# Patient Record
Sex: MALE | Race: ASIAN | NOT HISPANIC OR LATINO | Employment: FULL TIME | URBAN - METROPOLITAN AREA
[De-identification: names, ages, dates, MRNs, and addresses within clinical notes are randomized per-mention and may not be internally consistent; named-entity substitution may affect disease eponyms.]

---

## 2019-08-05 ENCOUNTER — OFFICE VISIT (OUTPATIENT)
Dept: FAMILY MEDICINE | Facility: CLINIC | Age: 54
End: 2019-08-05
Payer: COMMERCIAL

## 2019-08-05 VITALS
RESPIRATION RATE: 18 BRPM | DIASTOLIC BLOOD PRESSURE: 88 MMHG | HEIGHT: 64 IN | HEART RATE: 87 BPM | BODY MASS INDEX: 19.63 KG/M2 | TEMPERATURE: 98 F | SYSTOLIC BLOOD PRESSURE: 135 MMHG | OXYGEN SATURATION: 99 % | WEIGHT: 115 LBS

## 2019-08-05 DIAGNOSIS — R07.0 THROAT PAIN: Primary | ICD-10-CM

## 2019-08-05 DIAGNOSIS — J02.9 ACUTE VIRAL PHARYNGITIS: ICD-10-CM

## 2019-08-05 RX ORDER — IBUPROFEN 400 MG/1
400 TABLET, FILM COATED ORAL EVERY 6 HOURS PRN
Qty: 90 TABLET | Refills: 0 | Status: SHIPPED | OUTPATIENT
Start: 2019-08-05

## 2019-08-05 ASSESSMENT — MIFFLIN-ST. JEOR: SCORE: 1272.64

## 2019-08-05 NOTE — PROGRESS NOTES
[facility]         JOSE ALEJANDRO Cabrera is a 54 year old male with a presenting to clinic today with a chief complaint of sore throat.    Patient noticed acute onset throat pain on right lateral side of his neck that started 3 days ago. He reports a non-radiating, persistent dull ache that is progressively improving. Pain worsens with swallowing food or liquids and gets better with ice or tyelenol. He denies fever or chills, cough, fatigue, oral ulcer, nor nasal congestion. Accompanying symptoms include node enlargement at the same location that did not change in size as his pain improved. He also reports gluteal pain exacerbated by movement that started a day after his throat pain. He denies experiencing similar symptoms in the past and has no history of throat pain, fever, or cough during the past month. None of his family members are sick at this time. Patient thinks that his consumption of betel nuts may have caused his pain. His primary concern at this time is difficulty eating food.    PMH, Medications and Allergies were reviewed and updated as needed.    ROS:  General: No fevers, chills  Head: No headache. Positive for pain with swallowing.  CV: No chest pain or palpitations.  Resp: No shortness of breath.  No cough  GI: No nausea, vomiting, diarrhea  : No urinary pains    There is no problem list on file for this patient.      Current Outpatient Medications   Medication Sig Dispense Refill     ibuprofen (ADVIL/MOTRIN) 400 MG tablet Take 1 tablet (400 mg) by mouth every 6 hours as needed for moderate pain 90 tablet 0     cetirizine (ZYRTEC) 10 MG tablet Take 1 tablet (10 mg) by mouth every evening (Patient not taking: Reported on 8/5/2019) 30 tablet 1     fluticasone (FLONASE) 50 MCG/ACT nasal spray Spray 1-2 sprays into both nostrils daily (Patient not taking: Reported on 8/5/2019) 1 Bottle 11            OBJECTIVE:       Vitals:   Vitals:    08/05/19 0948   BP: 135/88   BP Location:  "Left arm   Patient Position: Sitting   Cuff Size: Adult Regular   Pulse: 87   Resp: 18   Temp: 98  F (36.7  C)   TempSrc: Oral   SpO2: 99%   Weight: 52.2 kg (115 lb)   Height: 1.626 m (5' 4\")     BMI: Body mass index is 19.74 kg/m .    Gen:  Well nourished and in no acute distress  HEENT: Extraocular movement intact.  Neck: Tender node on right lateral side of neck.   CV:  RRR  - no murmurs noted   Pulm:  CTAB, no wheezes or crackles noted, good air entry   ABD: soft, nontender, no masses, no rebound,  Extrem: no cyanosis, edema or clubbing  Psych: Euthymic           ASSESSMENT and PLAN:       Acute Viral Pharyngitis/throat pain: Absence of tonsillar exudate, fever and age of 54 decreases likelihood of GAS. Lymph node enlargement maybe secondary to viral process. Patient advised to use oral analgesics for symptom management. Recommended to return to clinic if symptoms do not improve in the course of the next 3 days. Educated methods to reduce throat pain. Informed patient about increasing chances of cancer with prolonged betel nut consumption and advised to decrease using them.  Stressed the importance of returning to clinic if his symptoms should return with the patient given his concerning history of prolonged fetal not use and high prevalence of oropharyngeal cancer with this.  - ibuprofen 400mg oral Q6H PRN, 90 tablets    Return to clinic in 3 days for follow up of throat pain if symptoms don't improve. Return sooner if develops new or worsening symptoms.    Options for treatment and/or follow-up care were reviewed with the patient was actively involved in the decision making process. Patient verbalized understanding and was in agreement with the plan.    Jim Pierce, MS-3    The patient was seen by and discussed with Harinder Raines MD    I was present with the medical student who participated in the service and in the documentation of this note. I have verified the history and personally performed the physical " exam and medical decision making, and have verified the content of the note, which accurately reflects my assessment of the patient and the plan of care.   Mirlande Panchal, DO Mirlande Panchal, DO PGY 3  [facility]  [phone]     denies pain/discomfort

## 2019-08-05 NOTE — NURSING NOTE
Due to patient being non-English speaking/uses sign language, an  was used for this visit. Only for face-to-face interpretation by an external agency, date and length of interpretation can be found on the scanned worksheet.       name: Rigo Sierra  Language: Shelli  Agency: [employer]  Phone number: [phone]  Type of interpretation:  Face-to-face, Spoken

## 2019-08-05 NOTE — PATIENT INSTRUCTIONS
Patient Education     When You Have a Sore Throat    A sore throat can be painful. There are many reasons why you may have a sore throat. Your healthcare provider will work with you to find the cause of your sore throat. He or she will also find the best treatment for you.  What causes a sore throat?  Sore throats can be caused or worsened by:    Cold or flu viruses    Bacteria    Irritants such as tobacco smoke or air pollution    Acid reflux  A healthy throat  The tonsils are on the sides of the throat near the base of the tongue. They collect viruses and bacteria and help fight infection. The throat (pharynx) is the passage for air. Mucus from the nasal cavity also moves down the passage.  An inflamed throat  The tonsils and pharynx can become inflamed due to a cold or flu virus. Postnasal drip (excess mucus draining from the nasal cavity) can irritate the throat. It can also make the throat or tonsils more likely to be infected by bacteria. Severe, untreated tonsillitis in children or adults can cause a pocket of pus (abscess) to form near the tonsil.  Your evaluation  A medical evaluation can help find the cause of your sore throat. It can also help your healthcare provider choose the best treatment for you. The evaluation may include a health history, physical exam, and diagnostic tests.  Health history  Your healthcare provider may ask you the following:    How long has the sore throat lasted and how have you been treating it?    Do you have any other symptoms, such as body aches, fever, or cough?    Does your sore throat recur? If so, how often? How many days of school or work have you missed because of a sore throat?    Do you have trouble eating or swallowing?    Have you been told that you snore or have other sleep problems?    Do you have bad breath?    Do you cough up bad-tasting mucus?  Physical exam  During the exam, your healthcare provider checks your ears, nose, and throat for problems. He or she  "also checks for swelling in the neck, and may listen to your chest.  Possible tests  Other tests your healthcare provider may perform include:    A throat swab to check for bacteria such as streptococcus (the bacteria that causes strep throat)    A blood test to check for mononucleosis (a viral infection)    A chest X-ray to rule out pneumonia, especially if you have a cough  Treating a sore throat  Treatment depends on many factors. What is the likely cause? Is the problem recent? Does it keep coming back? In many cases, the best thing to do is to treat the symptoms, rest, and let the problem heal itself. Antibiotics may help clear up some bacterial infections. For cases of severe or recurring tonsillitis, the tonsils may need to be removed.  Relieving your symptoms    Don t smoke, and avoid secondhand smoke.    For children, try throat sprays or Popsicles. Adults and older children may try lozenges.    Drink warm liquids to soothe the throat and help thin mucus. Avoid alcohol, spicy foods, and acidic drinks such as orange juice. These can irritate the throat.    Gargle with warm saltwater (1 teaspoon of salt to 8 ounces of warm water).    Use a humidifier to keep air moist and relieve throat dryness.    Try over-the-counter pain relievers such as acetaminophen or ibuprofen. Use as directed, and don t exceed the recommended dose. Don t give aspirin to children.   Are antibiotics needed?  If your sore throat is due to a bacterial infection, antibiotics may speed healing and prevent complications. Although group A streptococcus (\"strep throat\" or GAS) is the major treatable infection for a sore throat, GAS causes only 5% to 15% of sore throats in adults who seek medical care. Most sore throats are caused by cold or flu viruses. And antibiotics don t treat viral illness. In fact, using antibiotics when they re not needed may produce bacteria that are harder to kill. Your healthcare provider will prescribe antibiotics " only if he or she thinks they are likely to help.  If antibiotics are prescribed  Take the medicine exactly as directed. Be sure to finish your prescription even if you re feeling better. And be sure to ask your healthcare provider or pharmacist what side effects are common and what to do about them.  Is surgery needed?  In some cases, tonsils need to be removed. This is often done as outpatient (same-day) surgery. Your healthcare provider may advise removing the tonsils in cases of:    Several severe bouts of tonsillitis in a year.  Severe  episodes include those that lead to missed days of school or work, or that need to be treated with antibiotics.    Tonsillitis that causes breathing problems during sleep    Tonsillitis caused by food particles collecting in pouches in the tonsils (cryptic tonsillitis)  Call your healthcare provider if any of the following occur:    Symptoms worsen, or new symptoms develop.    Swollen tonsils make breathing difficult.    The pain is severe enough to keep you from drinking liquids.    A skin rash, hives, or wheezing develops. Any of these could signal an allergic reaction to antibiotics.    Symptoms don t improve within a week.    Symptoms don t improve within 2 to 3 days of starting antibiotics.   Date Last Reviewed: 10/1/2016    5950-9170 [employer]. [address] [place] [address], [place] [address]. All rights reserved. This information is not intended as a substitute for professional medical care. Always follow your healthcare professional's instructions.

## 2019-08-05 NOTE — PROGRESS NOTES
Preceptor Attestation:   Patient seen, evaluated and discussed with the resident. I have verified the content of the note, which accurately reflects my assessment of the patient and the plan of care.   Supervising Physician:  Harinder Raines MD

## 2019-08-09 ENCOUNTER — OFFICE VISIT (OUTPATIENT)
Dept: FAMILY MEDICINE | Facility: CLINIC | Age: 54
End: 2019-08-09
Payer: COMMERCIAL

## 2019-08-09 VITALS
DIASTOLIC BLOOD PRESSURE: 87 MMHG | HEART RATE: 81 BPM | SYSTOLIC BLOOD PRESSURE: 131 MMHG | TEMPERATURE: 98.1 F | BODY MASS INDEX: 20.69 KG/M2 | OXYGEN SATURATION: 98 % | RESPIRATION RATE: 16 BRPM | HEIGHT: 64 IN | WEIGHT: 121.2 LBS

## 2019-08-09 DIAGNOSIS — R07.0 THROAT PAIN: ICD-10-CM

## 2019-08-09 DIAGNOSIS — J02.9 ACUTE VIRAL PHARYNGITIS: Primary | ICD-10-CM

## 2019-08-09 ASSESSMENT — MIFFLIN-ST. JEOR: SCORE: 1296.79

## 2019-08-09 NOTE — PROGRESS NOTES
[facility]         JOSE ALEJANDRO Cabrera is a 54 year old male is here today to follow up on his acute viral pharyngitis/throat pain. He was last seen 4 days ago on 8/05. He reports that he continues to feel pain on his right lateral neck, but is generally better compared to his last visit. Pain is non-constant and non-radiating that is aggravated with swallowing, yawning, and smelling burned meat. Pain rated as 3,4 out of 10. He reports that he feels as if something is stuck in his throat after swallowing. He has been using ibuprofen prescribed from his last visit that helps with the pain. He continues to have a estrella swelling on his right lateral neck. He denies cough, mouth dryness, or headaches. He reports hearing sounds when tapping below his right ear. He discontinued eating betle nuts and used salt water gargle since his last visit.    PMH, Medications and Allergies were reviewed and updated as needed.    ROS:  General: No fevers, headaches, chills  Head: No headache  Ears: No acute change in hearing. Hears sounds when he pushes below his right ear.   Neck: estrella swelling below on upper right neck on lateral side  CV: No chest pain or palpitations.  Resp: No shortness of breath.  No cough. No hemoptysis.  GI: No nausea, vomiting, constipation, diarrhea  : No urinary pains    There is no problem list on file for this patient.      Current Outpatient Medications   Medication Sig Dispense Refill     ibuprofen (ADVIL/MOTRIN) 400 MG tablet Take 1 tablet (400 mg) by mouth every 6 hours as needed for moderate pain 90 tablet 0     cetirizine (ZYRTEC) 10 MG tablet Take 1 tablet (10 mg) by mouth every evening (Patient not taking: Reported on 8/5/2019) 30 tablet 1     fluticasone (FLONASE) 50 MCG/ACT nasal spray Spray 1-2 sprays into both nostrils daily (Patient not taking: Reported on 8/5/2019) 1 Bottle 11            OBJECTIVE:       Vitals:   Vitals:    08/09/19 0812 08/09/19 0815   BP: (!)  "143/90 131/87   BP Location: Left arm Left arm   Patient Position: Sitting Sitting   Pulse: 81    Resp: 16    Temp: 98.1  F (36.7  C)    TempSrc: Oral    SpO2: 98%    Weight: 55 kg (121 lb 3.2 oz)    Height: 1.619 m (5' 3.75\")      BMI: Body mass index is 20.97 kg/m .    Gen:  Well nourished and in no acute distress  HEENT: No swelling or exudate on oropharyngeal surface, slight scarring on tympanic membrane from past hx - no congestion  Neck: non-tender, estrella swelling on right lateral upper neck. Improved from prior exam.   CV:  RRR  - no murmurs noted   Pulm:  CTAB, no wheezes or crackles noted, good air entry   Psych: Euthymic           ASSESSMENT and PLAN:     Acute Viral Pharyngitis/throat pain:  Absence of exudate or swelling in the oropharynx and fever decreases the likelihood of any bacterial causes. The lymph node was slightly smaller in size and less firm compared to his last visit. This may be the swollen glands secondary to viral causes slowly resolving. Recommended patient to continue his current management plan of taking ibuprofen and using salt water gargles. Asked him to follow up in 2 weeks, unless he feels that his symptoms are resolved and there is no need to come back.    Discussed the relationship of oral pharyngeal cancers and betel nut with the patient at his last visit.  Please see that note for further details.  I do not think that this represents any cancerous cause at this time.  We will continue to closely monitor and have him return should patient's symptoms not go away.    Return to clinic in 2 weeks for follow up of throat pain. Return sooner if develops new or worsening symptoms.    Options for treatment and/or follow-up care were reviewed with the patient was actively involved in the decision making process. Patient verbalized understanding and was in agreement with the plan.    The patient was seen by and discussed with MD Jim Dawn, MS-3    I was present with " the medical student who participated in the service and in the documentation of this note. I have verified the history and personally performed the physical exam and medical decision making, and have verified the content of the note, which accurately reflects my assessment of the patient and the plan of care.     Mirlande Panchal, DO PGY 3  [facility]  [phone]

## 2019-08-09 NOTE — NURSING NOTE
Due to patient being non-English speaking/uses sign language, an  was used for this visit. Only for face-to-face interpretation by an external agency, date and length of interpretation can be found on the scanned worksheet.     name: Sen De La Cruz  Agency: [employer]  Language: Shelli   Telephone number: [phone]  Type of interpretation: Face-to-face, spoken

## 2019-08-09 NOTE — PROGRESS NOTES
Preceptor Attestation:   Patient seen, evaluated and discussed with the resident. I have verified the content of the note, which accurately reflects my assessment of the patient and the plan of care.   Supervising Physician:  Ramez Echeverria MD

## 2019-08-09 NOTE — PATIENT INSTRUCTIONS
1) Schedule 2 week follow up   2) Can cancel if symptoms completely resolve    Mirlande Panchal, DO PGY3  [facility]  [phone]

## 2019-08-09 NOTE — LETTER
August 9, 2019      Cordell Rick  [address] [address]        Dear Employer,    Please excuse Cordell Cabrera from work from 8/5/19 - 8/9/19 due to illness. Safe to return to work 8/12/19.    Sincerely,    Mirlande Panchal, DO

## 2020-04-02 ENCOUNTER — TELEPHONE (OUTPATIENT)
Dept: FAMILY MEDICINE | Facility: CLINIC | Age: 55
End: 2020-04-02

## 2020-04-02 NOTE — TELEPHONE ENCOUNTER
Called patient with an interpretor, ABILIO with his daughter informing her that our clinic is open for the whole family if anyone has concerns.     MODESTA Bailon

## 2023-10-19 ENCOUNTER — OFFICE VISIT (OUTPATIENT)
Dept: FAMILY MEDICINE | Facility: CLINIC | Age: 58
End: 2023-10-19
Payer: COMMERCIAL

## 2023-10-19 ENCOUNTER — OFFICE VISIT (OUTPATIENT)
Dept: PHARMACY | Facility: CLINIC | Age: 58
End: 2023-10-19
Payer: COMMERCIAL

## 2023-10-19 VITALS
HEIGHT: 63 IN | HEART RATE: 72 BPM | SYSTOLIC BLOOD PRESSURE: 102 MMHG | TEMPERATURE: 98.2 F | WEIGHT: 123.8 LBS | RESPIRATION RATE: 20 BRPM | OXYGEN SATURATION: 99 % | BODY MASS INDEX: 21.93 KG/M2 | DIASTOLIC BLOOD PRESSURE: 64 MMHG

## 2023-10-19 DIAGNOSIS — Z11.4 SCREENING FOR HIV (HUMAN IMMUNODEFICIENCY VIRUS): ICD-10-CM

## 2023-10-19 DIAGNOSIS — Z11.59 NEED FOR HEPATITIS C SCREENING TEST: ICD-10-CM

## 2023-10-19 DIAGNOSIS — I50.9 CONGESTIVE HEART FAILURE, UNSPECIFIED HF CHRONICITY, UNSPECIFIED HEART FAILURE TYPE (H): Primary | ICD-10-CM

## 2023-10-19 DIAGNOSIS — Z09 HOSPITAL DISCHARGE FOLLOW-UP: ICD-10-CM

## 2023-10-19 DIAGNOSIS — I50.20 HEART FAILURE WITH REDUCED EJECTION FRACTION, NYHA CLASS I (H): Primary | ICD-10-CM

## 2023-10-19 LAB
ANION GAP SERPL CALCULATED.3IONS-SCNC: 11 MMOL/L (ref 7–15)
BUN SERPL-MCNC: 25.5 MG/DL (ref 6–20)
CALCIUM SERPL-MCNC: 9.1 MG/DL (ref 8.6–10)
CHLORIDE SERPL-SCNC: 105 MMOL/L (ref 98–107)
CHOLEST SERPL-MCNC: 177 MG/DL
CREAT SERPL-MCNC: 1.47 MG/DL (ref 0.67–1.17)
DEPRECATED HCO3 PLAS-SCNC: 25 MMOL/L (ref 22–29)
EGFRCR SERPLBLD CKD-EPI 2021: 55 ML/MIN/1.73M2
ERYTHROCYTE [DISTWIDTH] IN BLOOD BY AUTOMATED COUNT: 19.8 % (ref 10–15)
GLUCOSE SERPL-MCNC: 96 MG/DL (ref 70–99)
HCT VFR BLD AUTO: 38.1 % (ref 40–53)
HCV AB SERPL QL IA: NONREACTIVE
HDLC SERPL-MCNC: 48 MG/DL
HGB BLD-MCNC: 12 G/DL (ref 13.3–17.7)
HIV 1+2 AB+HIV1 P24 AG SERPL QL IA: NONREACTIVE
LDLC SERPL CALC-MCNC: 98 MG/DL
MCH RBC QN AUTO: 20.3 PG (ref 26.5–33)
MCHC RBC AUTO-ENTMCNC: 31.5 G/DL (ref 31.5–36.5)
MCV RBC AUTO: 65 FL (ref 78–100)
NONHDLC SERPL-MCNC: 129 MG/DL
PLATELET # BLD AUTO: 289 10E3/UL (ref 150–450)
POTASSIUM SERPL-SCNC: 4.2 MMOL/L (ref 3.4–5.3)
RBC # BLD AUTO: 5.9 10E6/UL (ref 4.4–5.9)
SODIUM SERPL-SCNC: 141 MMOL/L (ref 135–145)
TRIGL SERPL-MCNC: 156 MG/DL
WBC # BLD AUTO: 9.2 10E3/UL (ref 4–11)

## 2023-10-19 PROCEDURE — 87389 HIV-1 AG W/HIV-1&-2 AB AG IA: CPT

## 2023-10-19 PROCEDURE — 80061 LIPID PANEL: CPT

## 2023-10-19 PROCEDURE — 36415 COLL VENOUS BLD VENIPUNCTURE: CPT

## 2023-10-19 PROCEDURE — 85027 COMPLETE CBC AUTOMATED: CPT

## 2023-10-19 PROCEDURE — 80048 BASIC METABOLIC PNL TOTAL CA: CPT

## 2023-10-19 PROCEDURE — 99207 PR NO CHARGE LOS: CPT | Performed by: PHARMACIST

## 2023-10-19 PROCEDURE — 99204 OFFICE O/P NEW MOD 45 MIN: CPT | Mod: GC

## 2023-10-19 PROCEDURE — 86803 HEPATITIS C AB TEST: CPT

## 2023-10-19 RX ORDER — FUROSEMIDE 40 MG
40 TABLET ORAL DAILY
COMMUNITY
End: 2023-12-31

## 2023-10-19 RX ORDER — ISOSORBIDE DINITRATE 5 MG/1
2.5 TABLET ORAL
COMMUNITY
End: 2023-11-09

## 2023-10-19 RX ORDER — ATORVASTATIN CALCIUM 40 MG/1
40 TABLET, FILM COATED ORAL DAILY
COMMUNITY
End: 2023-12-31

## 2023-10-19 RX ORDER — HYDRALAZINE HYDROCHLORIDE 10 MG/1
10 TABLET, FILM COATED ORAL 3 TIMES DAILY
COMMUNITY
End: 2023-11-09

## 2023-10-19 RX ORDER — LOSARTAN POTASSIUM 25 MG/1
25 TABLET ORAL DAILY
COMMUNITY
End: 2023-12-18

## 2023-10-19 RX ORDER — METOPROLOL SUCCINATE 25 MG/1
12.5 TABLET, EXTENDED RELEASE ORAL DAILY
COMMUNITY
End: 2023-12-20

## 2023-10-19 RX ORDER — ASPIRIN 81 MG/1
81 TABLET, CHEWABLE ORAL DAILY
COMMUNITY
End: 2023-11-09

## 2023-10-19 RX ORDER — LANOLIN ALCOHOL/MO/W.PET/CERES
3 CREAM (GRAM) TOPICAL
COMMUNITY

## 2023-10-19 RX ORDER — NITROGLYCERIN 0.4 MG/1
0.4 TABLET SUBLINGUAL EVERY 5 MIN PRN
COMMUNITY

## 2023-10-19 NOTE — Clinical Note
Fyi only: pharmacy requires I route this note to you- discussed patient in clinic today.   Dulce Vila PharmHellenD. Medication Therapy Management Pharmacy Resident

## 2023-10-19 NOTE — PROGRESS NOTES
"  Assessment & Plan     1. Screening for HIV (human immunodeficiency virus)  - HIV Screening; Future    2. Need for hepatitis C screening test  - Hepatitis C Screen Reflex to HCV RNA Quant and Genotype; Future    3. Congestive heart failure, unspecified HF chronicity, unspecified heart failure type (H)  4. Hospital discharge follow-up  Patient was seen 10/8/2023 - 10/11/2023 [facility] for new acute heart failure exacerbation.  Not able to see labs or if echo was done at that time.  Patient reports no previous diagnoses.  Reports taking all new medications as prescribed with complete resolution of symptoms.  Patient is weighing himself daily and this has been consistent.  Vitally stable, exam is unremarkable.  Pharm.D. in room for med rec.management complicated by low health literacy, language barrier.  Reviewed diagnoses and prognosis, importance of continuing treatment with close follow-up.  - Follow-up at scheduled cardiology appointment 11/3/2023, reviewed this with patient and patient reports no transportation needs  - Lipid panel reflex to direct LDL Non-fasting; Future  - Basic metabolic panel; Future  - CBC with platelets; Future  -Follow-up in 1 month for heart failure and to review results      Padma López MD  [facility] ANNELIESE Landa is a 58 year old, presenting for the following health issues:  Heart Problem (Heart failure)      10/19/2023    10:59 AM   Additional Questions   Roomed by phoebe   Accompanied by self       HPI     10/8/23 - 10/11/23 [facility]. Went in because of dyspnea, hasn't happened before. Feels better after taking medication. No longer SOB, no chest pain. No orthopnea. Has had good adherence to medications. Kids set up meds with pill box.     Has a follow-up with cardiology 11/3/23.       Review of Systems   As above      Objective    /64   Pulse 72   Temp 98.2  F (36.8  C) (Oral)   Resp 20   Ht 1.608 m (5' 3.3\")   Wt 56.2 kg (123 " lb 12.8 oz)   SpO2 99%   BMI 21.72 kg/m    Body mass index is 21.72 kg/m .  Physical Exam  Constitutional:       General: He is not in acute distress.     Appearance: Normal appearance. He is not diaphoretic.   HENT:      Mouth/Throat:      Mouth: Mucous membranes are moist.      Pharynx: Oropharynx is clear.   Eyes:      Extraocular Movements: Extraocular movements intact.      Conjunctiva/sclera: Conjunctivae normal.   Cardiovascular:      Rate and Rhythm: Normal rate and regular rhythm.      Pulses: Normal pulses.      Heart sounds: Normal heart sounds.   Pulmonary:      Effort: Pulmonary effort is normal. No respiratory distress.      Breath sounds: Normal breath sounds.   Abdominal:      General: Abdomen is flat. There is no distension.      Palpations: Abdomen is soft.      Tenderness: There is no abdominal tenderness.   Musculoskeletal:         General: Normal range of motion.      Right lower leg: No edema.      Left lower leg: No edema.   Skin:     General: Skin is warm and dry.   Neurological:      General: No focal deficit present.      Mental Status: He is alert and oriented to person, place, and time.      Gait: Gait normal.   Psychiatric:         Mood and Affect: Mood normal.         Behavior: Behavior normal.

## 2023-10-19 NOTE — PROGRESS NOTES
"Medication Therapy Management (MTM) Encounter    ASSESSMENT:                            Medication Adherence/Access: Updated pillbox with cardiology's recommendations. \"X\" placed on bottles today that need to be stopped/disposed of. Labeled medications vials and pillbox with stickers, provided written instructions and pillbox chart to son in law today.     Tobacco Cessation Plan: Patient agreeable to trying nicotine gum to decrease chewing. Explained chew and park method, counseled patient to use nicotine gum in place of chewing tobacco whenever he has cravings to chew. Will assess tapering at future visits.     Hypertension/HFrEF:  Blood pressure at goal <130/80 mmHg today. On GDMT besides spironolactone managed by cardiology, doses are being titrated. Updated pillbox with cardiology recommendations today. Discussed with patient that these medications are keeping his heart healthy and will likely be lifelong therapies.     Hyperlipidemia/ASCVD:   Appropriately taking high intensity statin, aspirin removed from pillbox today. No changes recommended today.     Iron deficiency anemia:  On a trial of oral iron due to low serum iron and low tsat per cardiology notes. Repletion of iron has also been shown to reduce HF hospitalizations. Set up iron in pillbox for M,W,F administration. Educated patient to take every other day.     Insomnia:   Stable, no changes recommended today.     PLAN:                            Stop aspirin, hydralazine, isosorbide dinitrate per cardiology -  dispose in medication drop box.  Increase losartan to 25 mg daily per cardiology - pillbox updated today   Start nicotine gum 4mg - chew when you feel the urge to chew tobacco    refills of Jardiance - prescription sent to [facility] per son in law request  Set up iron in pillbox for Monday, Wednesday, Friday     Medication issues to be addressed at a future visit:   Nicotine gum/tobacco cessation  Bring pillbox to all appointments, " cardiology too    Follow-up:  PCP and PharmD 11/16, [facility] 12/1    SUBJECTIVE/OBJECTIVE:                          Cordell Cabrera is a 58 year old male coming in for an initial visit. He was referred to me from Dr López/Dr Carrington. Patient was accompanied by son in law. Patient seen with Shelli .     Reason for visit: Medication therapy management- confirm pillbox is set up correctly per patient request     Allergies/ADRs: Reviewed in chart  Past Medical History: Reviewed in chart  Tobacco: He reports that he has never smoked. His smokeless tobacco use includes chew.   Interested in quitting chewing tobacco, would like to take medications. Chews with morning tea. Chewing tobacco 3-4 times a day, down from previous 10 times per day on his own.   Alcohol: none  Caffiene: Drinking Birdy most mornings     Medication Adherence/Access:  Uses three times daily pillbox that his son helps him set up. Per patient, misses medication 0 times per week, sometimes misses middle of day hydralazine dose, though this was stopped by cardiology, see below. Patient brings medication vials and pillboxes to visit. Takes iron out of bottle, not out of pillbox. Has some refills from [facility] outpatient pharmacy, some from [facility].     Hypertension /HFrEF  Metoprolol succinate 12.5 mg daily  Losartan 25 mg daily (increased by cards 11/3)  Jardiance 10 mg daily  Furosemide 40 mg twice daily  Hydralazine 10 mg three times a day- (stopped by cards 11/3)   Isosorbide dinitrate 2.5 mg three times daily- (stopped by cards 11/3) Nitroglycerin 0.4 mg SL as needed     Last EF 22% (10/9/23). Follows with [facility] cardiology.   Patient reports no current medication side effects. Patient does not self-monitor blood pressure.       BP Readings from Last 3 Encounters:   11/09/23 100/66   10/19/23 102/64   08/09/19 131/87     Pulse Readings from Last 3 Encounters:   11/09/23 82   10/19/23 72   08/09/19 81     Hyperlipidemia  /ASCVD  Atorvastatin 40 mg daily  Aspirin 81 mg daily - (stopped by cards 11/3, no evidence of CAD)   Patient reports no significant myalgias or other side effects.  The 10-year ASCVD risk score (Bernadette BRUMFIELD, et al., 2019) is: 5.1%    Values used to calculate the score:      Age: 58 years      Sex: Male      Is Non- : No      Diabetic: No      Tobacco smoker: No      Systolic Blood Pressure: 100 mmHg      Is BP treated: Yes      HDL Cholesterol: 48 mg/dL      Total Cholesterol: 177 mg/dL       Recent Labs   Lab Test 10/19/23  1149   CHOL 177   HDL 48   LDL 98   TRIG 156*      Iron deficiency anemia  Ferrous sulfate 325 mg every other day   Of note, patient currently taking every day, out of the bottle. No side effects reported.     Insomnia  Melatonin 3 mg at bedtime as needed   Effective when he takes it, is not using it every night.     Today's Vitals: /66   Pulse 82   Temp 98  F (36.7  C)   Resp 16   Wt 123 lb 6.4 oz (56 kg)   SpO2 97%   BMI 21.65 kg/m    ----------------  Post Discharge Medication Reconciliation Status: discharge medications reconciled, continue medications without change.    I spent 60 minutes with this patient today. David López/Charissa (resident physician) was provided the recommendations above  via routed note and Dr. Nguyen is the authorizing prescriber for this visit through the pharmacist collaborative practice agreement.. A copy of the visit note was provided to the patient's provider(s).    A summary of these recommendations was given to the patient.    Dulce Vila, Pharm.D.  Medication Therapy Management Pharmacy Resident        Medication Therapy Recommendations  Iron deficiency anemia, unspecified iron deficiency anemia type    Current Medication: ferrous gluconate (FERGON) 324 (38 Fe) MG tablet   Rationale: Does not understand instructions - Adherence - Adherence   Recommendation: Provide Adherence Intervention   Status: Patient Agreed -  Adherence/Education         Nicotine dependence, uncomplicated, unspecified nicotine product type    Rationale: Untreated condition - Needs additional medication therapy - Indication   Recommendation: Start Medication - nicotine polacrilex 4 MG gum   Status: Accepted per CPA

## 2023-10-19 NOTE — Clinical Note
Genaro Carrington,  I saw this patient with Dr. Haley today for med rec with Dr. López. See note for more details.   Thanks,  Dulce Vila, Pharm.D. Medication Therapy Management Pharmacy Resident

## 2023-10-19 NOTE — PROGRESS NOTES
Clinical Pharmacy Consult:                                                    Cordell aCbrera is a 58 year old male seen for a transitions of care visit. He was discharged from [facility] on 10/8/23 for 10/11/23. Patient seen with PASHA Marques .     Reason for Consult: medication reconciliation.    Discussion:   The following medications were started in the hospital:   aspirin 81 MG chewable tablet Chew and swallow 1 Tablet (81 mg) by mouth daily.,  atorvastatin (LIPITOR) 40 MG tablet Take 1 Tablet (40 mg) by mouth every evening.  empagliflozin (JARDIANCE) 10 MG tablet Take 1 Tablet (10 mg) by mouth daily.  furosemide (LASIX) 40 MG tablet Take 1 Tablet (40 mg) by mouth daily.  hydrALAZINE (APRESOLINE) 10 MG tablet Take 1 Tablet (10 mg) by mouth three times a day  isosorbide dinitrate (ISORDIL) 5 MG tablet Take one-half tablet (2.5 mg) by mouth three times a day with meals  losartan (COZAAR) 25 MG tablet Take one-half tablet (12.5 mg) by mouth daily.  melatonin 3 MG tablet Take 1 Tablet (3 mg) by mouth daily at bedtime.,  metoprolol succinate (TOPROL XL) 25 MG 24 hour release tablet Take one-half tablet (12.5 mg) by mouth daily.,   nitroglycerin (NITROSTAT) 0.4 MG sublingual tablet Dissolve 1 Tablet (0.4 mg) under tongue every 5 minutes as needed for Chest Pain.    This patient's medication list has been updated to reflect their current medications.  Their medications have not been reviewed for indication, effectiveness, safety or convenience. Please see the note today from Dr. López for additional information.    Plan:  As directed by Dr. López  Initial MTM visit scheduled for 11/9 to review medications/pillbox per patient request.     Post Discharge Medication Reconciliation Status: discharge medications reconciled, continue medications without change.    I spent 15 minutes with this patient today. David López and Charissa (resident physician) was provided the recommendations above in clinic today and Dr. Nguyen is the  authorizing prescriber for this visit through the pharmacist collaborative practice agreement.. A copy of the visit note was provided to the patient's provider(s).    A summary of these recommendations was given to the patient (see AVS from today's appointment with Dr López).    Dulce Vila, Pharm.D.  Medication Therapy Management Pharmacy Resident      Medication Therapy Recommendations  No medication therapy recommendations to display

## 2023-10-20 NOTE — PROGRESS NOTES
I have verified the content of the note, which accurately reflects my assessment of the patient and the plan of care.   Rose Haley, [facility], PharmD

## 2023-10-23 ENCOUNTER — DOCUMENTATION ONLY (OUTPATIENT)
Dept: FAMILY MEDICINE | Facility: CLINIC | Age: 58
End: 2023-10-23
Payer: COMMERCIAL

## 2023-10-23 DIAGNOSIS — D50.9 IRON DEFICIENCY ANEMIA, UNSPECIFIED IRON DEFICIENCY ANEMIA TYPE: Primary | ICD-10-CM

## 2023-10-23 RX ORDER — FERROUS GLUCONATE 324(38)MG
324 TABLET ORAL EVERY OTHER DAY
Qty: 60 TABLET | Refills: 0 | Status: SHIPPED | OUTPATIENT
Start: 2023-10-23

## 2023-10-23 NOTE — PROGRESS NOTES
Reviewed records of recent hospitalization.  Significant for creatinine 1.21 and 1.28.  Unclear if this is baseline or acute.  Hemoglobin noted to be 11.1 on presentation, iron studies at that time showing normal ferritin, low iron, normal transferrin.  Supportive of likely iron deficiency anemia.  Slightly improved at office visit at 12.    Discussed these results with RN who will call patient and discuss new iron supplementation.  We will plan to recheck in 2 to 4 weeks.    Padma López MD

## 2023-10-23 NOTE — PROGRESS NOTES
Physician Attestation   I, Ramez Estrada MD, saw this patient and agree with the findings and plan of care as documented in the note.      Items personally reviewed/procedural attestation: vitals, labs which shows a possible iron deficiency anemia.    Ramez Estrada MD

## 2023-11-09 ENCOUNTER — OFFICE VISIT (OUTPATIENT)
Dept: PHARMACY | Facility: CLINIC | Age: 58
End: 2023-11-09
Payer: COMMERCIAL

## 2023-11-09 VITALS
DIASTOLIC BLOOD PRESSURE: 66 MMHG | WEIGHT: 123.4 LBS | RESPIRATION RATE: 16 BRPM | HEART RATE: 82 BPM | TEMPERATURE: 98 F | OXYGEN SATURATION: 97 % | SYSTOLIC BLOOD PRESSURE: 100 MMHG | BODY MASS INDEX: 21.65 KG/M2

## 2023-11-09 DIAGNOSIS — G47.00 INSOMNIA, UNSPECIFIED TYPE: ICD-10-CM

## 2023-11-09 DIAGNOSIS — D50.9 IRON DEFICIENCY ANEMIA, UNSPECIFIED IRON DEFICIENCY ANEMIA TYPE: ICD-10-CM

## 2023-11-09 DIAGNOSIS — I50.20 HEART FAILURE WITH REDUCED EJECTION FRACTION, NYHA CLASS I (H): Primary | ICD-10-CM

## 2023-11-09 DIAGNOSIS — F17.200 NICOTINE DEPENDENCE, UNCOMPLICATED, UNSPECIFIED NICOTINE PRODUCT TYPE: ICD-10-CM

## 2023-11-09 DIAGNOSIS — I10 BENIGN ESSENTIAL HTN: ICD-10-CM

## 2023-11-09 PROCEDURE — 99605 MTMS BY PHARM NP 15 MIN: CPT | Performed by: PHARMACIST

## 2023-11-09 PROCEDURE — 99607 MTMS BY PHARM ADDL 15 MIN: CPT | Performed by: PHARMACIST

## 2023-11-09 NOTE — Clinical Note
Hi Dr. Carrington and Dr. López,  This patient has Dr. Carrington listed as PCP but I know he has been seeing Dr. López. I saw this patient today. No big medication changes from us today, just making sure his pillbox is up to date with what cardiology recommends. It sounds like his son does his pillbox and maybe a different son in law has been going to appointments with him. I will continue to follow-up with him to work on setting up pillbox but would think that we need to tell him to bring his pillbox to cardiology appointments so that updates can be made? See note for more details.   Thank you,  Dulce Vila, Pharm.D. Medication Therapy Management Pharmacy Resident

## 2023-11-09 NOTE — PROGRESS NOTES
Due to patient being non-English speaking/uses sign language, an  was used for this visit. Only for face-to-face interpretation by an external agency, date and length of interpretation can be found on the scanned worksheet.     name:  Agency:  [employer]  Language: Shelli   Telephone number: [phone]  Type of interpretation: Telephone, spoken

## 2023-11-09 NOTE — PATIENT INSTRUCTIONS
Stop aspirin, hydralazine, isodril - X placed on bottle today- dispose in medication drop box.  Increase Losartan to 25 mg daily- 1 tablet daily- pillbox updated today (blue star)   refills of Jardiance- need to fill Saturday and Sunday morning pillbox   Set up iron in [place] day pillbox - Monday, Wednesday, Friday (green stars)  Start nicotine gum - chew whenever you feel the urge to chew tobacco     Collection Sites   [facility]  [address]  [place], [place] Monday - Friday  8 a.m. - 4:30 p.m.  Drop box located inside.         [place]  [facility]  [address] [place] [address] Open year-round, 24/7  Drop box located inside lob.   [facility]  [address] [place], [place] Monday - Friday  8 a.m. - 4:30 p.m.  Drop box located inside.   [place] [facility]  [address] [place], [place] Monday - Friday  8 a.m. - 4:30 p.m.  Drop box located inside.         Closed for construction until mid-2024  [place]  [employer]  [address]  [place], [place] Monday - Friday  8 a.m. - 4 p.m.  Drop box located inside.   Free parking is available at all locations.     [email or web address]         Medication List            Accurate as of November 9, 2023 11:41 AM. If you have any questions, ask your nurse or doctor.                CONTINUE taking these medications        Morning Afternoon Evening Bedtime   atorvastatin 40 MG tablet  Also known as: LIPITOR  Take 40 mg by mouth daily              empagliflozin 10 MG Tabs tablet  Also known as: JARDIANCE  Take 10 mg by mouth daily              ferrous gluconate 324 (38 Fe) MG tablet  Also known as: FERGON  Take 1 tablet (324 mg) by mouth every other day         Monday Wednesday Friday only       furosemide 40 MG tablet  Also known as: LASIX  Take 40 mg by mouth  daily              ibuprofen 400 MG tablet  Also known as: ADVIL/MOTRIN  Take 1 tablet (400 mg) by mouth every 6 hours as needed for moderate pain            losartan 25 MG tablet  Also known as: COZAAR  Take 25 mg by mouth daily        1 whole tablet        melatonin 3 MG tablet  Take 3 mg by mouth nightly as needed for sleep            metoprolol succinate ER 25 MG 24 hr tablet  Also known as: TOPROL XL  Take 12.5 mg by mouth daily        1/2 tablet         nitroGLYcerin 0.4 MG sublingual tablet  Also known as: NITROSTAT  Place 0.4 mg under the tongue every 5 minutes as needed for chest pain For chest pain place 1 tablet under the tongue every 5 minutes for 3 doses. If symptoms persist 5 minutes after 1st dose call 911.

## 2023-11-10 NOTE — PROGRESS NOTES
I have verified the content of the note, which accurately reflects my assessment of the patient and the plan of care.   Rose Haley, [facility], PharmD

## 2023-11-16 ENCOUNTER — OFFICE VISIT (OUTPATIENT)
Dept: FAMILY MEDICINE | Facility: CLINIC | Age: 58
End: 2023-11-16
Payer: COMMERCIAL

## 2023-11-16 VITALS
SYSTOLIC BLOOD PRESSURE: 91 MMHG | DIASTOLIC BLOOD PRESSURE: 61 MMHG | BODY MASS INDEX: 22 KG/M2 | HEART RATE: 60 BPM | TEMPERATURE: 97.7 F | RESPIRATION RATE: 16 BRPM | OXYGEN SATURATION: 99 % | WEIGHT: 125.4 LBS

## 2023-11-16 DIAGNOSIS — D50.9 IRON DEFICIENCY ANEMIA, UNSPECIFIED IRON DEFICIENCY ANEMIA TYPE: Primary | ICD-10-CM

## 2023-11-16 DIAGNOSIS — I50.9 CONGESTIVE HEART FAILURE, UNSPECIFIED HF CHRONICITY, UNSPECIFIED HEART FAILURE TYPE (H): ICD-10-CM

## 2023-11-16 PROCEDURE — 90471 IMMUNIZATION ADMIN: CPT

## 2023-11-16 PROCEDURE — 99214 OFFICE O/P EST MOD 30 MIN: CPT | Mod: 25

## 2023-11-16 PROCEDURE — 90480 ADMN SARSCOV2 VAC 1/ONLY CMP: CPT

## 2023-11-16 PROCEDURE — 90677 PCV20 VACCINE IM: CPT

## 2023-11-16 PROCEDURE — 91320 SARSCV2 VAC 30MCG TRS-SUC IM: CPT

## 2023-11-16 NOTE — NURSING NOTE
Due to patient being non-English speaking/uses sign language, an  was used for this visit. Only for face-to-face interpretation by an external agency, date and length of interpretation can be found on the scanned worksheet.     name: [id number]  Agency: [facility] [employer]    Language: Shelli   Telephone number: [phone]  Type of interpretation: Telephone, spoken

## 2023-11-16 NOTE — PROGRESS NOTES
Preceptor Attestation:    I discussed the patient with the resident and evaluated the patient in person. I have verified the content of the note, which accurately reflects my assessment of the patient and the plan of care.   Supervising Physician:  Mario Nguyen MD.

## 2023-11-16 NOTE — PROGRESS NOTES
Assessment & Plan     Congestive heart failure, unspecified HF chronicity, unspecified heart failure type (H)  Patient presents for follow up on heart failure. Per hospital discharge on 10/11/23, Cardiology recommended the following: Lasix 40 mg daily, Metoprolol 12.5 mg QD, Isordil 2.5 mg TID, Hydralazine 10 mg TID, Jardiance 10 mg QD. Follow up with cardiology on 11/3 and recommended to discontinue asa, hydralazine, and isordil. Losartan was increased to 25 mg at that time, per cardiology. Patient heart failure medications optimized at this time. He does have severely diminished EF at 22% per TTE on 10/9, suspect patient at increased risk factor for decompensation and will benefit from close monitoring of medications. Follow up with cardiology scheduled for 12/01. Discussed following up with PCP in 2-4 weeks.   -NYHA Class II  -ACEi/ARB yes, losartan 25  -BB yes, metoprolol 12.5 mg QD  -Aldosterone antagonist deferred while other medical therapy is prioritized  -Fluid status euvolemic    Iron deficiency anemia, unspecified iron deficiency anemia type  Unclear source of anemia given iron deficiency less likely culprit in males. Patient would benefit from further work up including colonoscopy. Given HFrEF, EF 22%, Patient maybe a candicate for CT colonography. Patient wishes to discuss this option at next visit.     Patient Instructions   Thank you for trusting us with your care.     Here's a summary of the visit today:   Great job taking all your heart failure medications as prescribed! There are no medications changes today   Follow up with cardiologist on 12/02   Follow up with PCP/pharmD in 2-4 weeks              Thank you.     Dr. Darby      No follow-ups on file.    Odilon Darby DO, PGY-3  [facility]    Today I precepted with Dr. Wendy MD, who agrees with the assessment and plan.      Subjective   Cordell is a 58 year old, presenting for the following health issues:  Other (Follow up  "heart failure )      HPI     CHF   Patient was recently hospitalized for heart failure with reduced ejection fraction (HFrEF)  ACE/ARB Status:    Is patient taking an ACE-I or ARB?  Yes - current therapy includes:  Losartan 25  Beta Blocker Status:    Is patient taking a beta blocker?  Yes - current therapy includes:  Metoprolol 25   Fluid/Diuretic Status:   Is patient taking a loop diuretic?  Yes - current therapy includes:  lasix.  Discharge dose:  40.  Current Dose:  daily      Wt Readings from Last 5 Encounters:   11/16/23 56.9 kg (125 lb 6.4 oz)   11/09/23 56 kg (123 lb 6.4 oz)   10/19/23 56.2 kg (123 lb 12.8 oz)   08/09/19 55 kg (121 lb 3.2 oz)   08/05/19 52.2 kg (115 lb)     No results found for: \"MICROALBUMIN\"     Last Ejection Fraction:  22 - date 10/9  Current ankle edema?  none      Review of Systems   Constitutional, HEENT, cardiovascular, pulmonary, gi and gu systems are negative, except as otherwise noted.      Objective    BP 91/61 (BP Location: Left arm, Patient Position: Sitting, Cuff Size: Adult Regular)   Pulse 60   Temp 97.7  F (36.5  C) (Oral)   Resp 16   Wt 56.9 kg (125 lb 6.4 oz)   SpO2 99%   BMI 22.00 kg/m    Body mass index is 22 kg/m .  Physical Exam     GENERAL: healthy, alert and no distress  NECK: no adenopathy, no asymmetry, masses, or scars and thyroid normal to palpation  RESP: lungs clear to auscultation - no rales, rhonchi or wheezes  CV: regular rate and rhythm, normal S1 S2, no S3 or S4, no murmur, click or rub, no peripheral edema and peripheral pulses strong  ABDOMEN: soft, nontender, no hepatosplenomegaly, no masses and bowel sounds normal  MS: no gross musculoskeletal defects noted, no edema            Transthoracic Echo (10/9/2023)  Summary    1. Normal sinus rhythm during study.    2. Severe LV enlargement with normal wall thickness. Calculated biplane   LVEF  22%. (Severely reduced function). Grade 3 diastolic dysfunction.    3. Global hypokinesis of the left " ventricle.    4. Normal RV size with mildly decreased systolic function. Estimated PASP   36  mmHg + RA Pressure.    5. Moderate LA enlargement.    6. Mild RA enlargement.    7. Mild aortic valve regurgitation.    8. Moderate to severe mitral valve regurgitation.    9. There is mild tricuspid valve regurgitation.    10. The estimated pulmonary artery systolic pressure is 39 mmHg, mild pulmonary hypertension.    11. The IVC measures <2.1 cm with >50% collapse upon inspiration   consistent  with normal right atrial pressure, 3 mmHg.    12. The aortic root measures mildly dilated at 4.0 cm.    13. A prior study is not available for comparison.      Coronary Angiogram (10/9/2023)   Conclusions    1. No disease noted in the Left Main, Left Anterior Descending, Right, or  Circumflex coronary arteries. 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    2. RA 2 mmHg, PA 28/16 (21) mmHg, PCWP/LVEDP 18 mmHg.     3. Cardiac index 2.15 L/min/m2.      - Cardiac MRI (10/10/2023)  Summary    1. Severe left ventricular chamber enlargement, generalized hypokinesis   with  regional variability, severely reduced ejection fraction (16%), and no left  ventricular thrombus.    2. No abnormal late gadolinium enhancement of the left ventricular  myocardium to indicate either prior myocardial infarction or infiltrative  process, specifically cardiac amyloidosis; elevated extracellular volume is  most likely reflective of diffuse interstitial fibrosis in the context of a  nonischemic cardiomyopathy and decompensated heart failure.    3. No cardiac MR features of either myocarditis or hemochromatosis.    4. Normal right ventricular chamber size with severely reduced systolic  function (ejection fraction 32%).    5. Severe mitral valve regurgitation (predominantly functional with   leaflet  tethering and potential attendant incomplete leaflet coaptation)   (regurgitant  fraction 57%).    6. Moderate tricuspid valve regurgitation (presumed  secondary).    7. Dilated aortic root (to approximately 40 mm) with normal-caliber  ascending aorta where visualized partially.    8. Scattered pulmonary parenchymal hyperintensities compatible with  differential that includes pulmonary edema, though affected areas are in a  nondependent distribution. Recommend dedicated CT chest for further  assessment.

## 2023-11-16 NOTE — PATIENT INSTRUCTIONS
Thank you for trusting us with your care.     Here's a summary of the visit today:   Great job taking all your heart failure medications as prescribed! There are no medications changes today   Follow up with cardiologist on 12/02   Follow up with PCP/pharmD in 2-4 weeks              Thank you.     Dr. Darby

## 2023-12-18 ENCOUNTER — OFFICE VISIT (OUTPATIENT)
Dept: FAMILY MEDICINE | Facility: CLINIC | Age: 58
End: 2023-12-18
Payer: COMMERCIAL

## 2023-12-18 ENCOUNTER — OFFICE VISIT (OUTPATIENT)
Dept: PHARMACY | Facility: CLINIC | Age: 58
End: 2023-12-18
Payer: COMMERCIAL

## 2023-12-18 VITALS
HEART RATE: 94 BPM | OXYGEN SATURATION: 99 % | TEMPERATURE: 99.1 F | DIASTOLIC BLOOD PRESSURE: 78 MMHG | BODY MASS INDEX: 22.18 KG/M2 | HEIGHT: 63 IN | SYSTOLIC BLOOD PRESSURE: 122 MMHG | RESPIRATION RATE: 22 BRPM | WEIGHT: 125.2 LBS

## 2023-12-18 DIAGNOSIS — Z12.11 SCREEN FOR COLON CANCER: ICD-10-CM

## 2023-12-18 DIAGNOSIS — F17.200 NICOTINE DEPENDENCE, UNCOMPLICATED, UNSPECIFIED NICOTINE PRODUCT TYPE: ICD-10-CM

## 2023-12-18 DIAGNOSIS — I50.20 HEART FAILURE WITH REDUCED EJECTION FRACTION, NYHA CLASS I (H): Primary | ICD-10-CM

## 2023-12-18 DIAGNOSIS — I50.9 CONGESTIVE HEART FAILURE, UNSPECIFIED HF CHRONICITY, UNSPECIFIED HEART FAILURE TYPE (H): Primary | ICD-10-CM

## 2023-12-18 DIAGNOSIS — D50.9 IRON DEFICIENCY ANEMIA, UNSPECIFIED IRON DEFICIENCY ANEMIA TYPE: ICD-10-CM

## 2023-12-18 DIAGNOSIS — E78.5 HYPERLIPIDEMIA LDL GOAL <100: ICD-10-CM

## 2023-12-18 LAB
ACANTHOCYTES BLD QL SMEAR: ABNORMAL
AUER BODIES BLD QL SMEAR: ABNORMAL
BASO STIPL BLD QL SMEAR: ABNORMAL
BITE CELLS BLD QL SMEAR: ABNORMAL
BLISTER CELLS BLD QL SMEAR: ABNORMAL
BURR CELLS BLD QL SMEAR: ABNORMAL
DACRYOCYTES BLD QL SMEAR: ABNORMAL
ELLIPTOCYTES BLD QL SMEAR: ABNORMAL
ERYTHROCYTE [DISTWIDTH] IN BLOOD BY AUTOMATED COUNT: 22.5 % (ref 10–15)
FRAGMENTS BLD QL SMEAR: ABNORMAL
HCT VFR BLD AUTO: 42.2 % (ref 40–53)
HGB BLD-MCNC: 12.9 G/DL (ref 13.3–17.7)
HGB C CRYSTALS: ABNORMAL
HOWELL-JOLLY BOD BLD QL SMEAR: ABNORMAL
MCH RBC QN AUTO: 21.9 PG (ref 26.5–33)
MCHC RBC AUTO-ENTMCNC: 30.6 G/DL (ref 31.5–36.5)
MCV RBC AUTO: 72 FL (ref 78–100)
NEUTS HYPERSEG BLD QL SMEAR: ABNORMAL
PLAT MORPH BLD: ABNORMAL
PLATELET # BLD AUTO: 210 10E3/UL (ref 150–450)
POLYCHROMASIA BLD QL SMEAR: ABNORMAL
RBC # BLD AUTO: 5.88 10E6/UL (ref 4.4–5.9)
RBC AGGLUT BLD QL: ABNORMAL
RBC MORPH BLD: ABNORMAL
ROULEAUX BLD QL SMEAR: ABNORMAL
SICKLE CELLS BLD QL SMEAR: ABNORMAL
SMUDGE CELLS BLD QL SMEAR: ABNORMAL
SPHEROCYTES BLD QL SMEAR: ABNORMAL
STOMATOCYTES BLD QL SMEAR: ABNORMAL
TARGETS BLD QL SMEAR: ABNORMAL
TOXIC GRANULES BLD QL SMEAR: ABNORMAL
VARIANT LYMPHS BLD QL SMEAR: ABNORMAL
WBC # BLD AUTO: 9.4 10E3/UL (ref 4–11)

## 2023-12-18 PROCEDURE — 82728 ASSAY OF FERRITIN: CPT

## 2023-12-18 PROCEDURE — 83540 ASSAY OF IRON: CPT

## 2023-12-18 PROCEDURE — 85027 COMPLETE CBC AUTOMATED: CPT

## 2023-12-18 PROCEDURE — 36415 COLL VENOUS BLD VENIPUNCTURE: CPT

## 2023-12-18 PROCEDURE — 84443 ASSAY THYROID STIM HORMONE: CPT

## 2023-12-18 PROCEDURE — 84460 ALANINE AMINO (ALT) (SGPT): CPT

## 2023-12-18 PROCEDURE — 83550 IRON BINDING TEST: CPT

## 2023-12-18 PROCEDURE — 99607 MTMS BY PHARM ADDL 15 MIN: CPT | Performed by: PHARMACIST

## 2023-12-18 PROCEDURE — 99214 OFFICE O/P EST MOD 30 MIN: CPT | Mod: GC

## 2023-12-18 PROCEDURE — 99606 MTMS BY PHARM EST 15 MIN: CPT | Performed by: PHARMACIST

## 2023-12-18 RX ORDER — LOSARTAN POTASSIUM 25 MG/1
25 TABLET ORAL DAILY
Qty: 90 TABLET | Refills: 3 | Status: SHIPPED | OUTPATIENT
Start: 2023-12-18 | End: 2024-03-26

## 2023-12-18 RX ORDER — SPIRONOLACTONE 25 MG/1
12.5 TABLET ORAL
COMMUNITY
Start: 2023-12-01 | End: 2024-02-06

## 2023-12-18 NOTE — PROGRESS NOTES
Medication Therapy Management (MTM) Encounter    ASSESSMENT:                            Medication Adherence/Access: Provided written instructions and pillbox chart to son today, sent new losartan prescription with instructions for 1 tablet daily. Advised son to send pillbox chart to cardiology visits so that medication updates can be made on the chart. Medication updates historically are not made if the medication chart is not updated. Sent MyChart invite to son so that medication issues and questions can be answered.     Tobacco Cessation Plan: Patient has been using nicotine gum to decrease chewing. Counseled patient to use nicotine gum in place of chewing tobacco whenever he has cravings to chew and not using it at the same time as or before tobacco.     Hypertension/HFrEF:  Blood pressure at goal <130/80 mmHg today. On GDMT managed by cardiology, doses are being titrated. Discussed with patient and children that these medications are keeping his heart healthy and will be lifelong therapies. Patient appropriately knows how to use nitroglycerin.     Hyperlipidemia/ASCVD:   Appropriately taking high intensity statin. No changes recommended today.     Iron deficiency anemia:  On a trial of oral iron due to low serum iron and low tsat per cardiology notes. No changes today.     PLAN:                            Start spironolactone 12.5 mg daily (1/2 tablet)   refill of nicotine gum and continue using.     Medication issues to be addressed at a future visit:   Bring pillbox/pillbox chart to all appointments, cardiology too    Morning Evening   Furosemide 40 mg - 1 tablet  Jardiance 10 mg - 1 tablet  Losartan 25 mg - 1 tablet  Metoprolol succinate 25 mg - 1/2 tablet  Spironolactone 25 mg - 1/2 tablet    Ferrous sulfate 325 goes in MWF [place] pillbox Atorvastatin 40 mg - 1 tablet     Follow-up:  3 months with PCP/PharmD     SUBJECTIVE/OBJECTIVE:                          Cordell Cabrera is a 58 year old male coming in for  an initial visit. He was referred to me from Dr López/Dr Carrington. Patient was accompanied by son in law. Patient seen with Shelli .     Reason for visit: Medication therapy management     Allergies/ADRs: Reviewed in chart  Past Medical History: Reviewed in chart  Tobacco: He reports that he has never smoked. His smokeless tobacco use includes chew.   Interested in quitting chewing tobacco, would like to take medications. Chews with morning tea, though has decreased use. Still chewing tobacco after nicotine gum and reports dizziness when he does this.   Alcohol: none  Caffiene: Drinking Birdy most mornings     Medication Adherence/Access:  Uses three times daily pillbox that his son helps him set up. Per patient, misses medication 0 times per week, though has been out of losartan for 4 days- see below. Patient brings medication vials and pillboxes to visit, though pillboxes are empty and patient does not have all medication vials. Prefers refills at [facility] on [address]     Hypertension /HFrEF  Metoprolol succinate 12.5 mg daily  Losartan 25 mg daily - has missed 4-5 days- [facility] says refill too soon  Jardiance 10 mg daily  Furosemide 40 mg daily  Spironolactone 12.5 mg daily- though has not picked up and started yet  Nitroglycerin 0.4 mg SL as needed     Last EF 22% (10/9/23). Follows with [facility].   Patient reports no current medication side effects. Patient does not self-monitor blood pressure.       BP Readings from Last 3 Encounters:   12/18/23 122/78   11/16/23 91/61   11/09/23 100/66     Pulse Readings from Last 3 Encounters:   12/18/23 94   11/16/23 60   11/09/23 82         Hyperlipidemia /ASCVD  Atorvastatin 40 mg daily    Patient reports no significant myalgias or other side effects.  The 10-year ASCVD risk score (Bernadette BRUMFIELD, et al., 2019) is: 7.2%    Values used to calculate the score:      Age: 58 years      Sex: Male      Is Non- : No      Diabetic: No       "Tobacco smoker: No      Systolic Blood Pressure: 122 mmHg      Is BP treated: Yes      HDL Cholesterol: 48 mg/dL      Total Cholesterol: 177 mg/dL       Recent Labs   Lab Test 10/19/23  1149   CHOL 177   HDL 48   LDL 98   TRIG 156*      Iron deficiency anemia  Ferrous sulfate 325 mg MWF  Of note, patient currently taking out of afternoon pillbox, MWF, no stomach upset.     Today's Vitals:   BP Readings from Last 1 Encounters:   12/18/23 122/78     Pulse Readings from Last 1 Encounters:   12/18/23 94     Wt Readings from Last 1 Encounters:   12/18/23 125 lb 3.2 oz (56.8 kg)     Ht Readings from Last 1 Encounters:   12/18/23 5' 3.3\" (1.608 m)     Estimated body mass index is 21.97 kg/m  as calculated from the following:    Height as of an earlier encounter on 12/18/23: 5' 3.3\" (1.608 m).    Weight as of an earlier encounter on 12/18/23: 125 lb 3.2 oz (56.8 kg).    Temp Readings from Last 1 Encounters:   12/18/23 99.1  F (37.3  C) (Oral)     ----------------    I spent 30 minutes with this patient today. Dr. López (resident physician) was provided the recommendations above in clinic today and Dr. Nguyen is the authorizing prescriber for this visit through the pharmacist collaborative practice agreement.. A copy of the visit note was provided to the patient's provider(s).    A summary of these recommendations was given to the patient.    Dulce Vila, Pharm.D.  Medication Therapy Management Pharmacy Resident        Medication Therapy Recommendations  Heart failure with reduced ejection fraction, NYHA class I (H)    Current Medication: spironolactone (ALDACTONE) 25 MG tablet   Rationale: Does not understand instructions - Adherence - Adherence   Recommendation: Provide Education   Status: Patient Agreed - Adherence/Education              "

## 2023-12-18 NOTE — PROGRESS NOTES
I have verified the content of the note, which accurately reflects my assessment of the patient and the plan of care.   Anna Cerna, [facility], PharmD

## 2023-12-18 NOTE — PROGRESS NOTES
Preceptor Attestation:  I discussed the patient with the resident and evaluated the patient in person. I have verified the content of the note, which accurately reflects my assessment of the patient and the plan of care.  Supervising Physician:  Joy aGge MD.

## 2023-12-18 NOTE — PROGRESS NOTES
"  Assessment & Plan     Congestive heart failure, unspecified HF chronicity, unspecified heart failure type (H)  Follow-up. Hx EF 22% on 10/9. NYHA Class II. Previous visits: Seen by cardiology 12/1, was started on spironolactone.  Following up with cardiologist in 1 month. Reviewed cardiology notes.   -Continue losartan 25, metoprolol 12.5, spironolactone 25, jardiance 10mg  -Will message cardiologist asking if they would write in any medications new or changed in medication pill box template patient has and instructed to bring to all appointments  -Recommended close follow-up though patient deferred scheduling until after cardiology and colonoscopy appointment. Discussed symptoms warranting more urgent visit and patient reports understanding.   -Pharm D in room with patient    Iron deficiency anemia  Unclear source. Asymptomatic. Would benefit from colonoscopy. Is taking iron supplementation. Last colonoscopy was >10 years ago, agrees to colonoscopy. Given new diagnosis of HFrEF, will write to cardiologist prior to ordering.   -Repeat CBC  -Discuss with cardiologist if from their perspective okay to go forward with colonoscopy      Padma López MD  [facility]    Ivanna Landa is a 58 year old, presenting for the following health issues:  Other (Follow-up last visit)      HPI     HF: Feeling better. Weight at home checking daily, weight is down, was previously 125 - 126. Now 120, 121. BP systolic 100-120 at home. No symptomatic lows. Taking medications regularly.     Review of Systems   As above      Objective    /78   Pulse 94   Temp 99.1  F (37.3  C) (Oral)   Resp 22   Ht 1.608 m (5' 3.3\")   Wt 56.8 kg (125 lb 3.2 oz)   SpO2 99%   BMI 21.97 kg/m    Body mass index is 21.97 kg/m .  Physical Exam  Constitutional:       Appearance: Normal appearance.   HENT:      Nose: Nose normal.   Eyes:      Extraocular Movements: Extraocular movements intact.      Conjunctiva/sclera: " Conjunctivae normal.   Cardiovascular:      Rate and Rhythm: Normal rate and regular rhythm.      Pulses: Normal pulses.      Heart sounds: Normal heart sounds.   Pulmonary:      Effort: Pulmonary effort is normal. No respiratory distress.      Breath sounds: Normal breath sounds.   Abdominal:      General: Abdomen is flat. There is no distension.      Palpations: Abdomen is soft.   Musculoskeletal:      Right lower leg: No edema.      Left lower leg: No edema.   Neurological:      General: No focal deficit present.      Mental Status: He is alert and oriented to person, place, and time.      Gait: Gait normal.   Psychiatric:         Mood and Affect: Mood normal.         Behavior: Behavior normal.

## 2023-12-18 NOTE — PATIENT INSTRUCTIONS
Make an appointment for heart failure follow-up with your PCP after you see your heart doctor and complete the colonoscopy.    refills of nicotine gum at [facility]  Start spironolactone 12.5 mg (1/2 tablet) daily in the mornings

## 2023-12-19 LAB
ALT SERPL W P-5'-P-CCNC: 28 U/L (ref 0–70)
FERRITIN SERPL-MCNC: 39 NG/ML (ref 31–409)
IRON BINDING CAPACITY (ROCHE): 287 UG/DL (ref 240–430)
IRON SATN MFR SERPL: 55 % (ref 15–46)
IRON SERPL-MCNC: 157 UG/DL (ref 61–157)
TSH SERPL DL<=0.005 MIU/L-ACNC: 0.75 UIU/ML (ref 0.3–4.2)

## 2023-12-20 ENCOUNTER — MYC REFILL (OUTPATIENT)
Dept: FAMILY MEDICINE | Facility: CLINIC | Age: 58
End: 2023-12-20
Payer: COMMERCIAL

## 2023-12-20 DIAGNOSIS — I50.9 CONGESTIVE HEART FAILURE, UNSPECIFIED HF CHRONICITY, UNSPECIFIED HEART FAILURE TYPE (H): Primary | ICD-10-CM

## 2023-12-21 RX ORDER — METOPROLOL SUCCINATE 25 MG/1
12.5 TABLET, EXTENDED RELEASE ORAL DAILY
Qty: 90 TABLET | Refills: 0 | Status: SHIPPED | OUTPATIENT
Start: 2023-12-21 | End: 2024-01-05

## 2023-12-31 ENCOUNTER — MYC REFILL (OUTPATIENT)
Dept: FAMILY MEDICINE | Facility: CLINIC | Age: 58
End: 2023-12-31
Payer: COMMERCIAL

## 2023-12-31 ENCOUNTER — MYC REFILL (OUTPATIENT)
Dept: PHARMACY | Facility: CLINIC | Age: 58
End: 2023-12-31
Payer: COMMERCIAL

## 2023-12-31 DIAGNOSIS — I50.9 CONGESTIVE HEART FAILURE, UNSPECIFIED HF CHRONICITY, UNSPECIFIED HEART FAILURE TYPE (H): Primary | ICD-10-CM

## 2023-12-31 DIAGNOSIS — I50.20 HEART FAILURE WITH REDUCED EJECTION FRACTION, NYHA CLASS I (H): ICD-10-CM

## 2024-01-03 DIAGNOSIS — I50.9 CONGESTIVE HEART FAILURE, UNSPECIFIED HF CHRONICITY, UNSPECIFIED HEART FAILURE TYPE (H): ICD-10-CM

## 2024-01-03 RX ORDER — FUROSEMIDE 40 MG
40 TABLET ORAL DAILY
Qty: 30 TABLET | Refills: 0 | Status: SHIPPED | OUTPATIENT
Start: 2024-01-03

## 2024-01-03 RX ORDER — ATORVASTATIN CALCIUM 40 MG/1
40 TABLET, FILM COATED ORAL DAILY
Qty: 90 TABLET | Refills: 3 | Status: SHIPPED | OUTPATIENT
Start: 2024-01-03

## 2024-01-03 NOTE — TELEPHONE ENCOUNTER
Routing refill request to provider for review/approval because:   Patient has documented A1c within the specified period of time.     Medication requested: empagliflozin (JARDIANCE) 10 MG TABS tablet   Last office visit: 12/18/23  [facility] appointments: none  Medication last refilled:     Last ordered: 1 month ago (11/9/2023) by Mario Nguyen MD     Last qualifying labs:   Component  Ref Range & Units 2 mo ago   Hemoglobin A1C  <=5.6 % 6.1 High        Routed to provider due to failed RN protocol.     DALTON Rascon

## 2024-01-05 RX ORDER — METOPROLOL SUCCINATE 25 MG/1
12.5 TABLET, EXTENDED RELEASE ORAL DAILY
Qty: 90 TABLET | Refills: 0 | Status: SHIPPED | OUTPATIENT
Start: 2024-01-05 | End: 2024-03-14

## 2024-01-05 NOTE — TELEPHONE ENCOUNTER
Medication requested: METOPROLOL ER SUCCINATE 25MG TABS   Last office visit: 12/18/23  Future [facility] appointments: none  Medication last refilled: 12/22/23  Last qualifying labs:   BP: 122/78  as of 12/18/2023      Prescription approved per [facility] Refill Protocol.     Beta-Blockers Protocol Gnhtet1001/03/2024 01:28 PM   Protocol Details Blood pressure under 140/90 in past 12 months    Patient is age 6 or older    Recent (12 mo) or future (30 days) visit within the authorizing provider's specialty    Medication is active on med list     DALTON Herrera, BSN

## 2024-02-06 ENCOUNTER — MYC REFILL (OUTPATIENT)
Dept: FAMILY MEDICINE | Facility: CLINIC | Age: 59
End: 2024-02-06
Payer: COMMERCIAL

## 2024-02-06 ENCOUNTER — MYC REFILL (OUTPATIENT)
Dept: PHARMACY | Facility: CLINIC | Age: 59
End: 2024-02-06
Payer: COMMERCIAL

## 2024-02-06 DIAGNOSIS — I50.20 HEART FAILURE WITH REDUCED EJECTION FRACTION, NYHA CLASS I (H): ICD-10-CM

## 2024-02-06 DIAGNOSIS — I50.20 HEART FAILURE WITH REDUCED EJECTION FRACTION, NYHA CLASS I (H): Primary | ICD-10-CM

## 2024-02-06 RX ORDER — SPIRONOLACTONE 25 MG/1
12.5 TABLET ORAL DAILY
Qty: 45 TABLET | Refills: 0 | Status: SHIPPED | OUTPATIENT
Start: 2024-02-06 | End: 2024-02-24

## 2024-02-06 NOTE — TELEPHONE ENCOUNTER
Medication requested: spironolactone (ALDACTONE) 25 MG tablet   Last office visit: 12/18/23  Future [facility] appointments: none  Medication last refilled: 12/18/23  Last qualifying labs: none    Routing refill request to provider for review/approval because:    Diuretics (Including Combos) Protocol [id number]/06/2024 12:15 AM   Protocol Details Medication indicated for associated diagnosis    Has GFR on file in past 12 months and most recent value is normal    Medication indicated for associated diagnosis        DALTON Herrera, BSN

## 2024-02-06 NOTE — TELEPHONE ENCOUNTER
Medication requested: empagliflozin (JARDIANCE) 10 MG TABS tablet   Last office visit: 12/18/23  Future [facility] appointments: none  Medication last refilled: 1/3/24  Last qualifying labs: none    Routing refill request to provider for review/approval because:  Sodium Glucose Co-Transport Inhibitor Agents Afawka1102/06/2024 12:15 AM   Protocol Details Patient has documented A1c within the specified period of time.    Has GFR on file in past 12 months and most recent value is normal     DALTON Herrera, BSN

## 2024-02-11 ENCOUNTER — HEALTH MAINTENANCE LETTER (OUTPATIENT)
Age: 59
End: 2024-02-11

## 2024-02-24 ENCOUNTER — MYC REFILL (OUTPATIENT)
Dept: FAMILY MEDICINE | Facility: CLINIC | Age: 59
End: 2024-02-24
Payer: COMMERCIAL

## 2024-02-24 DIAGNOSIS — I50.20 HEART FAILURE WITH REDUCED EJECTION FRACTION, NYHA CLASS I (H): ICD-10-CM

## 2024-02-26 RX ORDER — SPIRONOLACTONE 25 MG/1
12.5 TABLET ORAL DAILY
Qty: 45 TABLET | Refills: 0 | Status: SHIPPED | OUTPATIENT
Start: 2024-02-26

## 2024-02-26 NOTE — TELEPHONE ENCOUNTER
Routing refill request to provider for review/approval because:   Has GFR on file in past 12 months and most recent value is normal        Medication requested: spironolactone (ALDACTONE) 25 MG tablet   Last office visit: 12/18/2023  Future [facility] appointments: none  Medication last refilled: none  Last qualifying labs:   GFR, Estimated  >60 mL/min/1.73m2 >60   Hours Fasting  8 - 12 Hours 0.1   Comment: Lab unable to obtain patient's fasting status at time of specimen collection.   Resulting Agency [facility]     Specimen Collected: 12/01/23 12:09 PM    Performed by: [facility] Last Resulted: 12/01/23  1:05 PM   Received From: [facility]  Result Received: 12/18/23 10:56 AM        Routed to provider due to failed RN protocol.     DALTON Lugo

## 2024-03-14 ENCOUNTER — MYC REFILL (OUTPATIENT)
Dept: FAMILY MEDICINE | Facility: CLINIC | Age: 59
End: 2024-03-14
Payer: COMMERCIAL

## 2024-03-14 ENCOUNTER — MYC REFILL (OUTPATIENT)
Dept: PHARMACY | Facility: CLINIC | Age: 59
End: 2024-03-14
Payer: COMMERCIAL

## 2024-03-14 DIAGNOSIS — I50.9 CONGESTIVE HEART FAILURE, UNSPECIFIED HF CHRONICITY, UNSPECIFIED HEART FAILURE TYPE (H): ICD-10-CM

## 2024-03-14 DIAGNOSIS — I50.20 HEART FAILURE WITH REDUCED EJECTION FRACTION, NYHA CLASS I (H): ICD-10-CM

## 2024-03-15 RX ORDER — METOPROLOL SUCCINATE 25 MG/1
12.5 TABLET, EXTENDED RELEASE ORAL DAILY
Qty: 90 TABLET | Refills: 0 | Status: SHIPPED | OUTPATIENT
Start: 2024-03-15

## 2024-03-15 NOTE — TELEPHONE ENCOUNTER
Medication requested: empagliflozin (JARDIANCE) 10 MG TABS tablet   Last office visit: 12/18/24  Future [facility] appointments: none  Medication last refilled: 2/6/24  Last qualifying labs: none    Routing refill request to provider for review/approval because:    Sodium Glucose Co-Transport Inhibitor Agents Avwygi0503/14/2024 06:26 PM   Protocol Details Patient has documented A1c within the specified period of time.    Has GFR on file in past 12 months and most recent value is normal        DALTON Herrera, BSN

## 2024-03-15 NOTE — TELEPHONE ENCOUNTER
Medication requested: metoprolol succinate ER (TOPROL XL) 25 MG 24 hr tablet   Last office visit: 12/18/23  Future [facility] appointments: none  Medication last refilled: 1/5/24  Last qualifying labs: BP: 122/78  as of 12/18/2023     Prescription approved per [facility] Refill Protocol.  Beta-Blockers Protocol [id number]/14/2024 06:26 PM   Protocol Details Blood pressure under 140/90 in past 12 months    Patient is age 6 or older    Medication is active on med list    Medication indicated for associated diagnosis    Recent (12 mo) or future (90 days) visit within the authorizing provider's specialty     DALTON Herrera, BSN

## 2024-03-26 ENCOUNTER — MYC REFILL (OUTPATIENT)
Dept: PHARMACY | Facility: CLINIC | Age: 59
End: 2024-03-26
Payer: COMMERCIAL

## 2024-03-26 DIAGNOSIS — I50.20 HEART FAILURE WITH REDUCED EJECTION FRACTION, NYHA CLASS I (H): ICD-10-CM

## 2024-03-27 RX ORDER — LOSARTAN POTASSIUM 25 MG/1
25 TABLET ORAL DAILY
Qty: 90 TABLET | Refills: 3 | Status: SHIPPED | OUTPATIENT
Start: 2024-03-27

## 2024-03-27 NOTE — TELEPHONE ENCOUNTER
losartan (COZAAR) 25 MG tablet          Sig: Take 1 tablet (25 mg) by mouth daily    Disp: 90 tablet    Refills: 3    Start: 3/26/2024    Class: E-Prescribe    Non-formulary For: Heart failure with reduced ejection fraction, NYHA class I (H)    Last ordered: 3 months ago (12/18/2023) by Joy Gage MD     Routing refill request to provider for review/approval because:    Angiotensin-II Receptors Ywdorj0403/26/2024 09:15 PM   Protocol Details Has GFR on file in past 12 months and most recent value is normal        Sharon RN, BSN

## 2025-03-08 ENCOUNTER — HEALTH MAINTENANCE LETTER (OUTPATIENT)
Age: 60
End: 2025-03-08